# Patient Record
Sex: FEMALE | Race: ASIAN | NOT HISPANIC OR LATINO
[De-identification: names, ages, dates, MRNs, and addresses within clinical notes are randomized per-mention and may not be internally consistent; named-entity substitution may affect disease eponyms.]

---

## 2021-09-01 ENCOUNTER — TRANSCRIPTION ENCOUNTER (OUTPATIENT)
Age: 41
End: 2021-09-01

## 2023-04-01 ENCOUNTER — EMERGENCY (EMERGENCY)
Facility: HOSPITAL | Age: 43
LOS: 1 days | Discharge: HOME | End: 2023-04-01
Attending: EMERGENCY MEDICINE | Admitting: EMERGENCY MEDICINE
Payer: COMMERCIAL

## 2023-04-01 VITALS
SYSTOLIC BLOOD PRESSURE: 122 MMHG | RESPIRATION RATE: 18 BRPM | WEIGHT: 160.06 LBS | DIASTOLIC BLOOD PRESSURE: 85 MMHG | OXYGEN SATURATION: 93 % | TEMPERATURE: 98 F | HEART RATE: 72 BPM

## 2023-04-01 VITALS
OXYGEN SATURATION: 99 % | HEART RATE: 67 BPM | RESPIRATION RATE: 18 BRPM | DIASTOLIC BLOOD PRESSURE: 74 MMHG | SYSTOLIC BLOOD PRESSURE: 111 MMHG

## 2023-04-01 PROCEDURE — G1004: CPT

## 2023-04-01 PROCEDURE — 70450 CT HEAD/BRAIN W/O DYE: CPT | Mod: 26,MF

## 2023-04-01 PROCEDURE — 99284 EMERGENCY DEPT VISIT MOD MDM: CPT | Mod: 25

## 2023-04-01 PROCEDURE — 99284 EMERGENCY DEPT VISIT MOD MDM: CPT

## 2023-04-01 PROCEDURE — 70450 CT HEAD/BRAIN W/O DYE: CPT | Mod: MF

## 2023-04-01 RX ORDER — ACETAMINOPHEN 500 MG
975 TABLET ORAL ONCE
Refills: 0 | Status: COMPLETED | OUTPATIENT
Start: 2023-04-01 | End: 2023-04-01

## 2023-04-01 RX ADMIN — Medication 975 MILLIGRAM(S): at 17:32

## 2023-04-01 NOTE — ED PROVIDER NOTE - NS ED ATTENDING STATEMENT MOD
This was a shared visit with the ARMINDA. I reviewed and verified the documentation and independently performed the documented:

## 2023-04-01 NOTE — ED PROVIDER NOTE - MUSCULOSKELETAL, MLM
Spine appears normal, range of motion is not limited, no muscle or joint tenderness. no midline cervical/thoracic/lumbar tenderness. no chest wall tenderness, no pelvic tenderness. FROM of all extremities. radial and dp pulses equal and intact bilaterally.
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
## 2023-04-01 NOTE — ED PROVIDER NOTE - PATIENT PORTAL LINK FT
You can access the FollowMyHealth Patient Portal offered by [facility] by registering at the following website: [email or web address]. By joining [employer]’s FollowMyHealth portal, you will also be able to view your health information using other applications (apps) compatible with our system.
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
## 2023-11-15 PROBLEM — Z00.00 ENCOUNTER FOR PREVENTIVE HEALTH EXAMINATION: Status: ACTIVE | Noted: 2023-11-15

## 2023-12-05 ENCOUNTER — NON-APPOINTMENT (OUTPATIENT)
Age: 43
End: 2023-12-05

## 2023-12-05 ENCOUNTER — APPOINTMENT (OUTPATIENT)
Dept: CARDIOLOGY | Facility: CLINIC | Age: 43
End: 2023-12-05
Payer: COMMERCIAL

## 2023-12-05 VITALS
HEIGHT: 64 IN | DIASTOLIC BLOOD PRESSURE: 68 MMHG | HEART RATE: 70 BPM | BODY MASS INDEX: 28.17 KG/M2 | WEIGHT: 165 LBS | SYSTOLIC BLOOD PRESSURE: 99 MMHG | RESPIRATION RATE: 16 BRPM

## 2023-12-05 DIAGNOSIS — Z78.9 OTHER SPECIFIED HEALTH STATUS: ICD-10-CM

## 2023-12-05 DIAGNOSIS — E78.5 HYPERLIPIDEMIA, UNSPECIFIED: ICD-10-CM

## 2023-12-05 DIAGNOSIS — R42 DIZZINESS AND GIDDINESS: ICD-10-CM

## 2023-12-05 DIAGNOSIS — R55 DIZZINESS AND GIDDINESS: ICD-10-CM

## 2023-12-05 PROCEDURE — 93000 ELECTROCARDIOGRAM COMPLETE: CPT

## 2023-12-05 PROCEDURE — 99204 OFFICE O/P NEW MOD 45 MIN: CPT | Mod: 25

## 2023-12-05 RX ORDER — ROSUVASTATIN CALCIUM 5 MG/1
5 TABLET, FILM COATED ORAL
Refills: 0 | Status: ACTIVE | COMMUNITY